# Patient Record
Sex: FEMALE | Race: WHITE | NOT HISPANIC OR LATINO | URBAN - METROPOLITAN AREA
[De-identification: names, ages, dates, MRNs, and addresses within clinical notes are randomized per-mention and may not be internally consistent; named-entity substitution may affect disease eponyms.]

---

## 2017-05-30 ENCOUNTER — OUTPATIENT (OUTPATIENT)
Dept: OUTPATIENT SERVICES | Facility: HOSPITAL | Age: 57
LOS: 1 days | Discharge: HOME | End: 2017-05-30

## 2017-06-28 DIAGNOSIS — Z13.820 ENCOUNTER FOR SCREENING FOR OSTEOPOROSIS: ICD-10-CM

## 2017-06-28 DIAGNOSIS — N95.9 UNSPECIFIED MENOPAUSAL AND PERIMENOPAUSAL DISORDER: ICD-10-CM

## 2018-02-02 PROBLEM — Z00.00 ENCOUNTER FOR PREVENTIVE HEALTH EXAMINATION: Status: ACTIVE | Noted: 2018-02-02

## 2018-04-05 ENCOUNTER — FORM ENCOUNTER (OUTPATIENT)
Age: 58
End: 2018-04-05

## 2018-04-06 ENCOUNTER — OUTPATIENT (OUTPATIENT)
Dept: OUTPATIENT SERVICES | Facility: HOSPITAL | Age: 58
LOS: 1 days | Discharge: HOME | End: 2018-04-06

## 2018-04-06 DIAGNOSIS — Z12.31 ENCOUNTER FOR SCREENING MAMMOGRAM FOR MALIGNANT NEOPLASM OF BREAST: ICD-10-CM

## 2019-04-04 ENCOUNTER — APPOINTMENT (OUTPATIENT)
Dept: OBGYN | Facility: CLINIC | Age: 59
End: 2019-04-04
Payer: COMMERCIAL

## 2019-04-04 ENCOUNTER — OUTPATIENT (OUTPATIENT)
Dept: OUTPATIENT SERVICES | Facility: HOSPITAL | Age: 59
LOS: 1 days | Discharge: HOME | End: 2019-04-04

## 2019-04-04 VITALS — BODY MASS INDEX: 33.74 KG/M2 | WEIGHT: 215 LBS | HEIGHT: 67 IN

## 2019-04-04 DIAGNOSIS — Z01.419 ENCOUNTER FOR GYNECOLOGICAL EXAMINATION (GENERAL) (ROUTINE) W/OUT ABNORMAL FINDINGS: ICD-10-CM

## 2019-04-04 DIAGNOSIS — Z30.9 ENCOUNTER FOR GYNECOLOGICAL EXAMINATION (GENERAL) (ROUTINE) W/OUT ABNORMAL FINDINGS: ICD-10-CM

## 2019-04-04 DIAGNOSIS — Z12.4 ENCOUNTER FOR GYNECOLOGICAL EXAMINATION (GENERAL) (ROUTINE) W/OUT ABNORMAL FINDINGS: ICD-10-CM

## 2019-04-04 LAB
BILIRUB UR QL STRIP: NORMAL
GLUCOSE UR-MCNC: NORMAL
HCG UR QL: NORMAL EU/DL
HGB UR QL STRIP.AUTO: NORMAL
KETONES UR-MCNC: NORMAL
LEUKOCYTE ESTERASE UR QL STRIP: NORMAL
NITRITE UR QL STRIP: NORMAL
PH UR STRIP: 5
PROT UR STRIP-MCNC: NORMAL
SP GR UR STRIP: 1.02

## 2019-04-04 PROCEDURE — 81003 URINALYSIS AUTO W/O SCOPE: CPT | Mod: NC,QW

## 2019-04-04 PROCEDURE — 76705 ECHO EXAM OF ABDOMEN: CPT

## 2019-04-04 PROCEDURE — 99396 PREV VISIT EST AGE 40-64: CPT

## 2019-04-04 PROCEDURE — 76830 TRANSVAGINAL US NON-OB: CPT

## 2019-04-04 PROCEDURE — 77085 DXA BONE DENSITY AXL VRT FX: CPT

## 2019-04-05 DIAGNOSIS — Z00.00 ENCOUNTER FOR GENERAL ADULT MEDICAL EXAMINATION WITHOUT ABNORMAL FINDINGS: ICD-10-CM

## 2019-05-30 ENCOUNTER — FORM ENCOUNTER (OUTPATIENT)
Age: 59
End: 2019-05-30

## 2019-05-31 ENCOUNTER — OUTPATIENT (OUTPATIENT)
Dept: OUTPATIENT SERVICES | Facility: HOSPITAL | Age: 59
LOS: 1 days | Discharge: HOME | End: 2019-05-31
Payer: COMMERCIAL

## 2019-05-31 DIAGNOSIS — Z12.31 ENCOUNTER FOR SCREENING MAMMOGRAM FOR MALIGNANT NEOPLASM OF BREAST: ICD-10-CM

## 2019-05-31 PROCEDURE — 77067 SCR MAMMO BI INCL CAD: CPT | Mod: 26

## 2019-05-31 PROCEDURE — 77063 BREAST TOMOSYNTHESIS BI: CPT | Mod: 26

## 2019-11-07 ENCOUNTER — APPOINTMENT (OUTPATIENT)
Dept: SURGERY | Facility: CLINIC | Age: 59
End: 2019-11-07
Payer: COMMERCIAL

## 2019-11-07 VITALS
SYSTOLIC BLOOD PRESSURE: 160 MMHG | DIASTOLIC BLOOD PRESSURE: 100 MMHG | WEIGHT: 209 LBS | HEIGHT: 67 IN | BODY MASS INDEX: 32.8 KG/M2
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
## 2019-11-07 PROCEDURE — 99242 OFF/OP CONSLTJ NEW/EST SF 20: CPT

## 2019-11-07 RX ORDER — DOXYCYCLINE HYCLATE 100 MG/1
100 CAPSULE ORAL
Refills: 0 | Status: ACTIVE | COMMUNITY

## 2019-11-07 NOTE — HISTORY OF PRESENT ILLNESS
[de-identified] : Patient presents to the office with a history of infected skin lesion of the forehead and the posterior neck region. She is undecided antibiotics. As the patient the redness has gone down since yesterday. No history of any trauma. No definite history of an insect bite or a tic bite.

## 2019-11-07 NOTE — CONSULT LETTER
[Dear  ___] : Dear  [unfilled], [Consult Closing:] : Thank you very much for allowing me to participate in the care of this patient.  If you have any questions, please do not hesitate to contact me. [Please see my note below.] : Please see my note below. [Consult Letter:] : I had the pleasure of evaluating your patient, [unfilled]. [Sincerely,] : Sincerely, [FreeTextEntry3] : eSa Oakes MD, FACS\par [facility]\[place] [address]\par

## 2019-11-07 NOTE — PHYSICAL EXAM
[Alert] : alert [Oriented to Person] : oriented to person [Oriented to Place] : oriented to place [Oriented to Time] : oriented to time [Calm] : calm [de-identified] : A 59-year-old female patient in no acute distress. [de-identified] : On the right side of the midline in the forehead region approximately 2 cm from the hairline patient has approximately 2 cm size indurated reddened area which is slightly tender to touch. There is no fluctuation or sign of abscess at present. As per patient the redness has gone down since yesterday. [de-identified] : In the posterior neck region approximately 1-1/2 cm area of redness with induration with a central point which appears to be a scab. The scab was removed there was no evidence of any tic on examination. There is no evidence of abscess formation. [de-identified] : As described above in the findings patient had 2 skin lesions or area of inflammation in the right side of her head and the posterior neck region.

## 2019-11-07 NOTE — ASSESSMENT
[FreeTextEntry1] : After examination patient was explained about my findings. She was explained that as there is no area of fluctuation it is unlikely for an abscess to be present in the forehead lesion. As she is just starting her antibiotics we should give more time for the antibiotics were and reexamine her  next week. Follow up in the office next week. Patient was also told that in case if there is any change in her forehead lesion or if there is any drainage or let us know. Possibility of MRSA also explained to the patient.

## 2019-11-11 ENCOUNTER — APPOINTMENT (OUTPATIENT)
Dept: CARDIOLOGY | Facility: CLINIC | Age: 59
End: 2019-11-11
Payer: COMMERCIAL

## 2019-11-11 PROCEDURE — 93351 STRESS TTE COMPLETE: CPT

## 2019-11-11 PROCEDURE — 93320 DOPPLER ECHO COMPLETE: CPT

## 2019-11-11 PROCEDURE — 93325 DOPPLER ECHO COLOR FLOW MAPG: CPT

## 2019-11-14 ENCOUNTER — APPOINTMENT (OUTPATIENT)
Dept: SURGERY | Facility: CLINIC | Age: 59
End: 2019-11-14
Payer: COMMERCIAL

## 2019-11-14 VITALS
WEIGHT: 209 LBS | BODY MASS INDEX: 32.8 KG/M2 | DIASTOLIC BLOOD PRESSURE: 85 MMHG | SYSTOLIC BLOOD PRESSURE: 135 MMHG | HEIGHT: 67 IN

## 2019-11-14 DIAGNOSIS — L03.90 CELLULITIS, UNSPECIFIED: ICD-10-CM

## 2019-11-14 PROCEDURE — 99212 OFFICE O/P EST SF 10 MIN: CPT

## 2019-11-14 NOTE — ASSESSMENT
[FreeTextEntry1] : . The swelling is markedly down. Patient has no symptoms. She was advised to continue the local treatment. Followup in the office as needed.

## 2020-12-21 PROBLEM — Z01.419 ENCOUNTER FOR PHYSICAL EXAMINATION, CONTRACEPTION, AND PAPANICOLAOU SMEAR: Status: RESOLVED | Noted: 2019-04-04 | Resolved: 2020-12-21

## 2021-07-19 ENCOUNTER — APPOINTMENT (OUTPATIENT)
Dept: OBGYN | Facility: CLINIC | Age: 61
End: 2021-07-19
Payer: COMMERCIAL

## 2021-07-19 ENCOUNTER — NON-APPOINTMENT (OUTPATIENT)
Age: 61
End: 2021-07-19

## 2021-07-19 LAB
BILIRUB UR QL STRIP: NORMAL
GLUCOSE UR-MCNC: NORMAL
HCG UR QL: 0.2 EU/DL
HGB UR QL STRIP.AUTO: NORMAL
KETONES UR-MCNC: 15
LEUKOCYTE ESTERASE UR QL STRIP: NORMAL
NITRITE UR QL STRIP: NORMAL
PH UR STRIP: 5
PROT UR STRIP-MCNC: NORMAL
SP GR UR STRIP: 1.03

## 2021-07-19 PROCEDURE — 99072 ADDL SUPL MATRL&STAF TM PHE: CPT

## 2021-07-19 PROCEDURE — 99396 PREV VISIT EST AGE 40-64: CPT

## 2021-07-19 PROCEDURE — 81003 URINALYSIS AUTO W/O SCOPE: CPT | Mod: NC,QW

## 2021-07-22 LAB — HPV HIGH+LOW RISK DNA PNL CVX: NOT DETECTED

## 2021-07-27 LAB — CYTOLOGY CVX/VAG DOC THIN PREP: ABNORMAL

## 2021-08-05 ENCOUNTER — RESULT REVIEW (OUTPATIENT)
Age: 61
End: 2021-08-05

## 2021-08-05 ENCOUNTER — OUTPATIENT (OUTPATIENT)
Dept: OUTPATIENT SERVICES | Facility: HOSPITAL | Age: 61
LOS: 1 days | Discharge: HOME | End: 2021-08-05
Payer: COMMERCIAL

## 2021-08-05 DIAGNOSIS — Z12.31 ENCOUNTER FOR SCREENING MAMMOGRAM FOR MALIGNANT NEOPLASM OF BREAST: ICD-10-CM

## 2021-08-05 PROCEDURE — 77067 SCR MAMMO BI INCL CAD: CPT | Mod: 26

## 2021-08-05 PROCEDURE — 77063 BREAST TOMOSYNTHESIS BI: CPT | Mod: 26

## 2021-08-06 DIAGNOSIS — N95.9 UNSPECIFIED MENOPAUSAL AND PERIMENOPAUSAL DISORDER: ICD-10-CM

## 2021-08-06 DIAGNOSIS — Z13.820 ENCOUNTER FOR SCREENING FOR OSTEOPOROSIS: ICD-10-CM

## 2021-08-20 ENCOUNTER — OUTPATIENT (OUTPATIENT)
Dept: OUTPATIENT SERVICES | Facility: HOSPITAL | Age: 61
LOS: 1 days | Discharge: HOME | End: 2021-08-20
Payer: COMMERCIAL

## 2021-08-20 DIAGNOSIS — R92.8 OTHER ABNORMAL AND INCONCLUSIVE FINDINGS ON DIAGNOSTIC IMAGING OF BREAST: ICD-10-CM

## 2021-08-20 PROCEDURE — 76642 ULTRASOUND BREAST LIMITED: CPT | Mod: 26,RT

## 2021-08-20 PROCEDURE — G0279: CPT | Mod: 26

## 2021-08-20 PROCEDURE — 77065 DX MAMMO INCL CAD UNI: CPT | Mod: 26,RT

## 2021-09-13 ENCOUNTER — NON-APPOINTMENT (OUTPATIENT)
Age: 61
End: 2021-09-13

## 2021-10-27 ENCOUNTER — APPOINTMENT (OUTPATIENT)
Dept: ENDOCRINOLOGY | Facility: CLINIC | Age: 61
End: 2021-10-27
Payer: COMMERCIAL

## 2021-10-27 VITALS
WEIGHT: 220 LBS | DIASTOLIC BLOOD PRESSURE: 88 MMHG | BODY MASS INDEX: 34.53 KG/M2 | HEIGHT: 67 IN | OXYGEN SATURATION: 98 % | HEART RATE: 81 BPM | TEMPERATURE: 97 F | SYSTOLIC BLOOD PRESSURE: 128 MMHG

## 2021-10-27 DIAGNOSIS — Z80.3 FAMILY HISTORY OF MALIGNANT NEOPLASM OF BREAST: ICD-10-CM

## 2021-10-27 DIAGNOSIS — Z78.9 OTHER SPECIFIED HEALTH STATUS: ICD-10-CM

## 2021-10-27 DIAGNOSIS — Z83.49 FAMILY HISTORY OF OTHER ENDOCRINE, NUTRITIONAL AND METABOLIC DISEASES: ICD-10-CM

## 2021-10-27 DIAGNOSIS — Z82.49 FAMILY HISTORY OF ISCHEMIC HEART DISEASE AND OTHER DISEASES OF THE CIRCULATORY SYSTEM: ICD-10-CM

## 2021-10-27 PROCEDURE — 99203 OFFICE O/P NEW LOW 30 MIN: CPT

## 2021-10-27 RX ORDER — PSYLLIUM HUSK 0.4 G
CAPSULE ORAL
Refills: 0 | Status: ACTIVE | COMMUNITY

## 2021-10-27 NOTE — ASSESSMENT
[Weight Loss] : weight loss [Levothyroxine] : The patient was instructed to take Levothyroxine on an empty stomach, separate from vitamins, and wait at least 30 minutes before eating [FreeTextEntry1] : 61 year old female with PMH of HTn who present for evaluation of hypothyroidism \par \par hypothyroidism /weight gain \par - diagnosed 2-3 years ago on routine blood work , was prescribed levothyroxine but never started it especially during covid pandemic. \par - in 7/2021 had repeat blood work and TSH was found to be 31 with Ft4 of 0.9 was again prescribed Lt4 but did not start taking it as she is afraid that once your on meds you are on it for life \par - reviewed hypothyroidism diagnosis as well as possibility of Hashimoto , reviewed indication for treatment in her condition given mildly elevated cholesterol , reviewed pill technique and monitoring as well as consequences and possibility of myxedema in extreme cases if hypothyroidism is sever and untreated \par - patient agreeable to start 50 mcg daily \par - will have labs with PCP in 12/2021 \par \par thyroid nodules\par - isoechoic 1.3 cm , f/u with US in 1 year

## 2021-10-27 NOTE — REVIEW OF SYSTEMS
[As Noted in HPI] : as noted in HPI [Chest Pain] : no chest pain [Palpitations] : no palpitations [Fast Heart Rate] : heart rate is not fast [Lower Ext Edema] : no lower extremity edema [Shortness Of Breath] : no shortness of breath [Wheezing] : no wheezing [SOB on Exertion] : no shortness of breath on exertion [Nausea] : no nausea [Constipation] : no constipation [Vomiting] : no vomiting [Diarrhea] : no diarrhea [Gas/Bloating] : no gas/bloating [Polyuria] : no polyuria [Muscle Weakness] : no muscle weakness [Myalgia] : no myalgia  [Joint Stiffness] : no joint stiffness [Acanthosis] : no acanthosis  [Dry Skin] : no dry skin [Hair Loss] : no hair loss [Headaches] : no headaches [Dizziness] : no dizziness [Tremors] : no tremors [Cold Intolerance] : no cold intolerance [Heat Intolerance] : no heat intolerance [Easy Bruising] : no tendency for easy bruising

## 2021-10-27 NOTE — PHYSICAL EXAM
[Alert] : alert [Well Nourished] : well nourished [Obese] : obese [No Acute Distress] : no acute distress [No Respiratory Distress] : no respiratory distress [No Accessory Muscle Use] : no accessory muscle use [Clear to Auscultation] : lungs were clear to auscultation bilaterally [Normal S1, S2] : normal S1 and S2 [No Murmurs] : no murmurs [Normal Rate] : heart rate was normal [No Edema] : no peripheral edema [Not Tender] : non-tender [Not Distended] : not distended [Soft] : abdomen soft [No CVA Tenderness] : no ~M costovertebral angle tenderness [No Spinal Tenderness] : no spinal tenderness [No Stigmata of Cushings Syndrome] : no stigmata of Cushings Syndrome [Abdominal Striae] : no abdominal striae [Acanthosis Nigricans] : acanthosis nigricans present [Acne] : no acne [Normal Reflexes] : deep tendon reflexes were 2+ and symmetric [No Tremors] : no tremors [Oriented x3] : oriented to person, place, and time [de-identified] : puffy face, loss of 1/3 end of eyebrows [de-identified] : thyroid ,ildly enlarged, palpable nodule

## 2021-10-27 NOTE — DATA REVIEWED
[FreeTextEntry1] : \par 7/2021: glucose 1010 crea 0.81 calcium 9.4  TSH 31.28  FT4 0.9  Hba1c 5%    tg 118 25 vit D 22\par \par \par DEXA scan (8/2021) Normal \par \par \par thyroid US (2021) \par isoechoic nodule 1.3 cm

## 2021-10-27 NOTE — HISTORY OF PRESENT ILLNESS
[FreeTextEntry1] : 61 year old female with PMH of HTn who present for evaluation of hypothyroidism \par \par hypothyroidism :\par - diagnosed 2-3 years ago on routine blood work , was prescribed levothyroxine but never started it especially during covid pandemic. \par - in 7/2021 had repeat blood work and TSH was found to be 31 with Ft4 of 0.9 was again prescribed Lt4 but did not start taking it as she is afraid that once your on meds you are on it for life \par - report weight gain but attributed to sedentary lifestyle and working from home and unhealthy eating. \par - no constipation, no cold intolerance and no unusual fatigue no hair or skin problem, face a little more puffy

## 2022-01-31 ENCOUNTER — APPOINTMENT (OUTPATIENT)
Dept: ENDOCRINOLOGY | Facility: CLINIC | Age: 62
End: 2022-01-31

## 2022-03-15 ENCOUNTER — APPOINTMENT (OUTPATIENT)
Dept: ENDOCRINOLOGY | Facility: CLINIC | Age: 62
End: 2022-03-15

## 2022-03-24 ENCOUNTER — APPOINTMENT (OUTPATIENT)
Dept: ENDOCRINOLOGY | Facility: CLINIC | Age: 62
End: 2022-03-24
Payer: COMMERCIAL

## 2022-03-24 VITALS
HEART RATE: 76 BPM | DIASTOLIC BLOOD PRESSURE: 88 MMHG | WEIGHT: 233 LBS | OXYGEN SATURATION: 98 % | SYSTOLIC BLOOD PRESSURE: 140 MMHG | HEIGHT: 67 IN | BODY MASS INDEX: 36.57 KG/M2 | TEMPERATURE: 97.2 F

## 2022-03-24 PROCEDURE — 99213 OFFICE O/P EST LOW 20 MIN: CPT

## 2022-03-24 NOTE — PHYSICAL EXAM
[Alert] : alert [Well Nourished] : well nourished [Obese] : obese [No Acute Distress] : no acute distress [No Respiratory Distress] : no respiratory distress [No Accessory Muscle Use] : no accessory muscle use [Clear to Auscultation] : lungs were clear to auscultation bilaterally [Normal S1, S2] : normal S1 and S2 [No Murmurs] : no murmurs [Normal Rate] : heart rate was normal [No Stigmata of Cushings Syndrome] : no stigmata of Cushings Syndrome [Acanthosis Nigricans] : acanthosis nigricans present [No Tremors] : no tremors [Oriented x3] : oriented to person, place, and time [Abdominal Striae] : no abdominal striae [Acne] : no acne [de-identified] : puffy face, loss of 1/3 end of eyebrows [de-identified] : thyroid  mildly enlarged

## 2022-03-24 NOTE — HISTORY OF PRESENT ILLNESS
[FreeTextEntry1] : 61 year old female with PMH of HTn who present for follow up evaluation of hypothyroidism and thyroid nodules \par \par hypothyroidism :\par - diagnosed 2-3 years ago on routine blood work , was prescribed levothyroxine but never started it especially during covid pandemic. \par - in 7/2021 had repeat blood work and TSH was found to be 31 with Ft4 of 0.9 was again prescribed Lt4 but did not start taking it as she is afraid that once your on meds you are on it for life \par - report weight gain but attributed to sedentary lifestyle and working from home and unhealthy eating. \par - no constipation, no cold intolerance and no unusual fatigue no hair or skin problem, face a little more puffy \par - 11/2021: started Lt4 50 mcg daily \par - 12/10/21: TSH 17 .17 T3 92 compliant with meds

## 2022-03-24 NOTE — DATA REVIEWED
[FreeTextEntry1] : \par 7/2021: glucose 1010 crea 0.81 calcium 9.4  TSH 31.28  FT4 0.9  Hba1c 5%    tg 118 25 vit D 22\par 12/2021: TSH 17  T3 \par \par DEXA scan (8/2021) Normal \par \par \par thyroid US (7/2021) \par isoechoic nodule 1.3 cm \par \par thyroid US (1/2022)\par bilateral subcm nodules \par

## 2022-03-24 NOTE — ASSESSMENT
[Weight Loss] : weight loss [Levothyroxine] : The patient was instructed to take Levothyroxine on an empty stomach, separate from vitamins, and wait at least 30 minutes before eating [FreeTextEntry1] : 61 year old female with PMH of HTn who present for follow up evaluation of hypothyroidism / thyroid nodules \par \par hypothyroidism /weight gain \par - diagnosed 2-3 years ago on routine blood work , was prescribed levothyroxine but never started it especially during covid pandemic. \par - in 7/2021 had repeat blood work and TSH was found to be 31 with Ft4 of 0.9 was again prescribed Lt4 but did not start taking it as she is afraid that once your on meds you are on it for life \par - 11/2022: started lt4 50 mcg daily \par - 12/2022: labs with PCP TSh was still 17 \par - do labs now and based on results will adjust dose \par \par thyroid nodules\par subcm \par repeat US in 1-6/2023 \par \par advised can follow up with PCP

## 2022-03-25 LAB
T4 FREE SERPL-MCNC: 0.9 NG/DL
TSH SERPL-ACNC: 36.58 UIU/ML

## 2022-06-27 LAB
T4 FREE SERPL-MCNC: 1.1 NG/DL
TSH SERPL-ACNC: 13.04 UIU/ML

## 2022-07-08 ENCOUNTER — APPOINTMENT (OUTPATIENT)
Dept: ENDOCRINOLOGY | Facility: CLINIC | Age: 62
End: 2022-07-08

## 2022-07-08 VITALS
BODY MASS INDEX: 34.53 KG/M2 | WEIGHT: 220 LBS | OXYGEN SATURATION: 98 % | SYSTOLIC BLOOD PRESSURE: 122 MMHG | HEART RATE: 75 BPM | DIASTOLIC BLOOD PRESSURE: 78 MMHG | HEIGHT: 67 IN

## 2022-07-08 PROCEDURE — 99213 OFFICE O/P EST LOW 20 MIN: CPT

## 2022-07-08 NOTE — DATA REVIEWED
[FreeTextEntry1] : \par 7/2021: glucose 1010 crea 0.81 calcium 9.4  TSH 31.28  FT4 0.9  Hba1c 5%    tg 118 25 vit D 22\par 12/2021: TSH 17  T3 \par 3/2022: TSH 36.58   Ft4 0.9 \par 6/2022: TSH 13.04  Ft4 1.1 \par \par DEXA scan (8/2021) Normal \par \par \par thyroid US (7/2021) \par isoechoic nodule 1.3 cm \par \par thyroid US (1/2022)\par bilateral subcm nodules \par

## 2022-07-08 NOTE — PHYSICAL EXAM
[Alert] : alert [Well Nourished] : well nourished [Obese] : obese [No Acute Distress] : no acute distress [No Respiratory Distress] : no respiratory distress [No Accessory Muscle Use] : no accessory muscle use [Clear to Auscultation] : lungs were clear to auscultation bilaterally [Normal S1, S2] : normal S1 and S2 [No Murmurs] : no murmurs [Normal Rate] : heart rate was normal [No Stigmata of Cushings Syndrome] : no stigmata of Cushings Syndrome [Acanthosis Nigricans] : acanthosis nigricans present [No Tremors] : no tremors [Oriented x3] : oriented to person, place, and time [No Proptosis] : no proptosis [No Lid Lag] : no lid lag [Not Tender] : non-tender [Soft] : abdomen soft [Abdominal Striae] : no abdominal striae [Acne] : no acne [de-identified] : thyroid  mildly enlarged

## 2022-07-08 NOTE — ASSESSMENT
[Weight Loss] : weight loss [Levothyroxine] : The patient was instructed to take Levothyroxine on an empty stomach, separate from vitamins, and wait at least 30 minutes before eating [FreeTextEntry1] : 62 year old female with PMH of HTn who present for follow up evaluation of hypothyroidism / thyroid nodules \par \par hypothyroidism /weight gain \par - diagnosed 2-3 years ago on routine blood work , was prescribed levothyroxine but never started it especially during covid pandemic. \par - in 7/2021 had repeat blood work and TSH was found to be 31 with Ft4 of 0.9 was again prescribed Lt4 but did not start taking it as she is afraid that once your on meds you are on it for life \par - 11/2022: started lt4 50 mcg daily \par - 12/2021: labs with PCP TSh was still 17 \par -3/2022: TSH 36 dose was increased to 75 mcg daily \par - 7/2022 : TSH 13 FT4 1.1 feels ok and no hypo/hyperthyroid symptoms, will increase to 88 mcg daily , she is traveling to [facility], reviewed meds intake with time change \par - redo labs in 2-3 months \par \par \par thyroid nodules\par subcm \par repeat US in 1-6/2023 \par \par

## 2022-07-08 NOTE — HISTORY OF PRESENT ILLNESS
[FreeTextEntry1] : 61 year old female with PMH of HTn who present for follow up evaluation of hypothyroidism and thyroid nodules \par \par hypothyroidism :\par - diagnosed 2-3 years ago on routine blood work , was prescribed levothyroxine but never started it especially during covid pandemic. \par - in 7/2021 had repeat blood work and TSH was found to be 31 with Ft4 of 0.9 was again prescribed Lt4 but did not start taking it as she is afraid that once your on meds you are on it for life \par - report weight gain but attributed to sedentary lifestyle and working from home and unhealthy eating. \par - no constipation, no cold intolerance and no unusual fatigue no hair or skin problem, face a little more puffy \par - 11/2021: started Lt4 50 mcg daily \par - 12/10/21: TSH 17 .17 T3 92 compliant with meds \par - 3/2022 : TSH 36 , dose increased to 75 mcg daily \par - 7/2022: TSH 13 Ft4 1.1 , taking it daily, good pill technique, no hypothyroid symptoms

## 2022-07-08 NOTE — REVIEW OF SYSTEMS
[As Noted in HPI] : as noted in HPI [Recent Weight Loss (___ Lbs)] : recent weight loss: [unfilled] lbs [Chest Pain] : no chest pain [Palpitations] : no palpitations [Fast Heart Rate] : heart rate is not fast [Lower Ext Edema] : no lower extremity edema [Shortness Of Breath] : no shortness of breath [Wheezing] : no wheezing [SOB on Exertion] : no shortness of breath on exertion [Nausea] : no nausea [Constipation] : no constipation [Vomiting] : no vomiting [Diarrhea] : no diarrhea [Gas/Bloating] : no gas/bloating [Polyuria] : no polyuria [Muscle Weakness] : no muscle weakness [Myalgia] : no myalgia  [Joint Stiffness] : no joint stiffness [Acanthosis] : no acanthosis  [Dry Skin] : no dry skin [Hair Loss] : no hair loss [Headaches] : no headaches [Dizziness] : no dizziness [Tremors] : no tremors [Cold Intolerance] : no cold intolerance [Heat Intolerance] : no heat intolerance [Easy Bruising] : no tendency for easy bruising

## 2022-09-19 ENCOUNTER — APPOINTMENT (OUTPATIENT)
Dept: OBGYN | Facility: CLINIC | Age: 62
End: 2022-09-19

## 2022-09-19 VITALS — HEIGHT: 67 IN | TEMPERATURE: 98 F | WEIGHT: 219 LBS | BODY MASS INDEX: 34.37 KG/M2

## 2022-09-19 LAB
BILIRUB UR QL STRIP: NORMAL
GLUCOSE UR-MCNC: NORMAL
HCG UR QL: 0.2 EU/DL
HGB UR QL STRIP.AUTO: NORMAL
KETONES UR-MCNC: NORMAL
LEUKOCYTE ESTERASE UR QL STRIP: NORMAL
NITRITE UR QL STRIP: NORMAL
PH UR STRIP: 60
PROT UR STRIP-MCNC: NORMAL
SP GR UR STRIP: 1.02

## 2022-09-19 PROCEDURE — 99396 PREV VISIT EST AGE 40-64: CPT

## 2022-09-19 PROCEDURE — 81003 URINALYSIS AUTO W/O SCOPE: CPT | Mod: NC,QW

## 2022-09-25 LAB — HPV HIGH+LOW RISK DNA PNL CVX: NOT DETECTED

## 2022-09-29 LAB — CYTOLOGY CVX/VAG DOC THIN PREP: ABNORMAL

## 2022-09-30 ENCOUNTER — RESULT REVIEW (OUTPATIENT)
Age: 62
End: 2022-09-30

## 2022-09-30 ENCOUNTER — OUTPATIENT (OUTPATIENT)
Dept: OUTPATIENT SERVICES | Facility: HOSPITAL | Age: 62
LOS: 1 days | Discharge: HOME | End: 2022-09-30

## 2022-09-30 DIAGNOSIS — Z12.31 ENCOUNTER FOR SCREENING MAMMOGRAM FOR MALIGNANT NEOPLASM OF BREAST: ICD-10-CM

## 2022-09-30 DIAGNOSIS — Z80.3 FAMILY HISTORY OF MALIGNANT NEOPLASM OF BREAST: ICD-10-CM

## 2022-09-30 PROCEDURE — 77067 SCR MAMMO BI INCL CAD: CPT | Mod: 26

## 2022-09-30 PROCEDURE — 77063 BREAST TOMOSYNTHESIS BI: CPT | Mod: 26

## 2022-10-10 LAB
T4 FREE SERPL-MCNC: 1.3 NG/DL
TSH SERPL-ACNC: 8.17 UIU/ML

## 2022-10-17 ENCOUNTER — APPOINTMENT (OUTPATIENT)
Dept: ENDOCRINOLOGY | Facility: CLINIC | Age: 62
End: 2022-10-17

## 2022-10-17 VITALS
DIASTOLIC BLOOD PRESSURE: 80 MMHG | TEMPERATURE: 97.6 F | BODY MASS INDEX: 34.53 KG/M2 | HEIGHT: 67 IN | WEIGHT: 220 LBS | OXYGEN SATURATION: 98 % | HEART RATE: 76 BPM | SYSTOLIC BLOOD PRESSURE: 128 MMHG

## 2022-10-17 PROCEDURE — 99213 OFFICE O/P EST LOW 20 MIN: CPT

## 2022-10-17 NOTE — HISTORY OF PRESENT ILLNESS
[FreeTextEntry1] : 62 year old female with PMH of HTn who present for follow up evaluation of hypothyroidism and thyroid nodules \par \par hypothyroidism :\par - diagnosed 2-3 years ago on routine blood work , was prescribed levothyroxine but never started it especially during covid pandemic. \par - in 7/2021 had repeat blood work and TSH was found to be 31 with Ft4 of 0.9 was again prescribed Lt4 but did not start taking it as she is afraid that once your on meds you are on it for life \par - report weight gain but attributed to sedentary lifestyle and working from home and unhealthy eating. \par - no constipation, no cold intolerance and no unusual fatigue no hair or skin problem, face a little more puffy \par - 11/2021: started Lt4 50 mcg daily \par - 12/10/21: TSH 17 .17 T3 92 compliant with meds \par - 3/2022 : TSH 36 , dose increased to 75 mcg daily \par - 7/2022: TSH 13 Ft4 1.1 , taking it daily, good pill technique, no hypothyroid symptoms  dose increased to 88 mcg daily \par - 10/2022: TSH 8  ft4 1.3 feeling good , no hypo/hyper symptoms,

## 2022-10-17 NOTE — PHYSICAL EXAM
[Alert] : alert [Well Nourished] : well nourished [Obese] : obese [No Acute Distress] : no acute distress [No Proptosis] : no proptosis [No Lid Lag] : no lid lag [No Respiratory Distress] : no respiratory distress [No Accessory Muscle Use] : no accessory muscle use [Clear to Auscultation] : lungs were clear to auscultation bilaterally [Normal S1, S2] : normal S1 and S2 [No Murmurs] : no murmurs [Normal Rate] : heart rate was normal [Not Tender] : non-tender [Soft] : abdomen soft [No Stigmata of Cushings Syndrome] : no stigmata of Cushings Syndrome [Acanthosis Nigricans] : acanthosis nigricans present [No Tremors] : no tremors [Oriented x3] : oriented to person, place, and time [Abdominal Striae] : no abdominal striae [Acne] : no acne [de-identified] : thyroid  mildly enlarged

## 2022-10-17 NOTE — DATA REVIEWED
[FreeTextEntry1] : \par 7/2021: glucose 1010 crea 0.81 calcium 9.4  TSH 31.28  FT4 0.9  Hba1c 5%    tg 118 25 vit D 22\par 12/2021: TSH 17  T3 \par 3/2022: TSH 36.58   Ft4 0.9 \par 6/2022: TSH 13.04  Ft4 1.1 \par 10/2022: TSH 8.17  Ft4 1.3 \par \par DEXA scan (8/2021) Normal \par \par \par thyroid US (7/2021) \par isoechoic nodule 1.3 cm \par \par thyroid US (1/2022)\par bilateral subcm nodules \par

## 2022-10-17 NOTE — REVIEW OF SYSTEMS
[As Noted in HPI] : as noted in HPI [Recent Weight Loss (___ Lbs)] : no recent weight loss [Chest Pain] : no chest pain [Palpitations] : no palpitations [Fast Heart Rate] : heart rate is not fast [Lower Ext Edema] : no lower extremity edema [Shortness Of Breath] : no shortness of breath [Wheezing] : no wheezing [SOB on Exertion] : no shortness of breath on exertion [Nausea] : no nausea [Constipation] : no constipation [Vomiting] : no vomiting [Diarrhea] : no diarrhea [Gas/Bloating] : no gas/bloating [Polyuria] : no polyuria [Muscle Weakness] : no muscle weakness [Myalgia] : no myalgia  [Joint Stiffness] : no joint stiffness [Acanthosis] : no acanthosis  [Dry Skin] : no dry skin [Hair Loss] : no hair loss [Headaches] : no headaches [Dizziness] : no dizziness [Tremors] : no tremors [Cold Intolerance] : no cold intolerance [Heat Intolerance] : no heat intolerance [Easy Bruising] : no tendency for easy bruising

## 2022-10-17 NOTE — ASSESSMENT
[Weight Loss] : weight loss [Levothyroxine] : The patient was instructed to take Levothyroxine on an empty stomach, separate from vitamins, and wait at least 30 minutes before eating [FreeTextEntry1] : 62 year old female with PMH of HTN who present for follow up evaluation of hypothyroidism / thyroid nodules \par \par hypothyroidism /weight gain \par - diagnosed 2-3 years ago on routine blood work , was prescribed levothyroxine but never started it especially during covid pandemic. \par - in 7/2021 had repeat blood work and TSH was found to be 31 with Ft4 of 0.9 was again prescribed Lt4 but did not start taking it as she is afraid that once your on meds you are on it for life \par - 11/2022: started lt4 50 mcg daily \par - 12/2021: labs with PCP TSh was still 17 \par -3/2022: TSH 36 dose was increased to 75 mcg daily \par - 7/2022 : TSH 13 FT4 1.1 feels ok and no hypo/hyperthyroid symptoms,  increased to 88 mcg daily\par - 10/2022: TSH 8 ft4 1.3 on Lt4 88 mcg daily, compliant with good pill technique \par - increase to 100 mcg daily and redo labs in 3 months \par \par \par thyroid nodules\par subcm \par repeat US in 1-6/2023 before next visit \par \par

## 2023-01-19 LAB
T4 FREE SERPL-MCNC: 1.1 NG/DL
TSH SERPL-ACNC: 10.87 UIU/ML

## 2023-05-22 ENCOUNTER — APPOINTMENT (OUTPATIENT)
Dept: ENDOCRINOLOGY | Facility: CLINIC | Age: 63
End: 2023-05-22
Payer: COMMERCIAL

## 2023-05-22 VITALS
HEART RATE: 75 BPM | OXYGEN SATURATION: 98 % | WEIGHT: 235 LBS | TEMPERATURE: 97.2 F | SYSTOLIC BLOOD PRESSURE: 140 MMHG | BODY MASS INDEX: 36.88 KG/M2 | HEIGHT: 67 IN | DIASTOLIC BLOOD PRESSURE: 80 MMHG

## 2023-05-22 LAB
T4 FREE SERPL-MCNC: 1.4 NG/DL
TSH SERPL-ACNC: 5.66 UIU/ML

## 2023-05-22 PROCEDURE — 99213 OFFICE O/P EST LOW 20 MIN: CPT

## 2023-05-22 NOTE — REVIEW OF SYSTEMS
[As Noted in HPI] : as noted in HPI [Recent Weight Loss (___ Lbs)] : no recent weight loss [Chest Pain] : no chest pain [Palpitations] : no palpitations [Fast Heart Rate] : heart rate is not fast [Lower Ext Edema] : no lower extremity edema [Shortness Of Breath] : no shortness of breath [Wheezing] : no wheezing [Nausea] : no nausea [SOB on Exertion] : no shortness of breath on exertion [Constipation] : no constipation [Vomiting] : no vomiting [Diarrhea] : no diarrhea [Gas/Bloating] : no gas/bloating [Polyuria] : no polyuria [Muscle Weakness] : no muscle weakness [Myalgia] : no myalgia  [Joint Stiffness] : no joint stiffness [Acanthosis] : no acanthosis  [Dry Skin] : no dry skin [Hair Loss] : no hair loss [Headaches] : no headaches [Dizziness] : no dizziness [Tremors] : no tremors [Cold Intolerance] : no cold intolerance [Heat Intolerance] : no heat intolerance [Easy Bruising] : no tendency for easy bruising

## 2023-05-22 NOTE — ASSESSMENT
[Weight Loss] : weight loss [Levothyroxine] : The patient was instructed to take Levothyroxine on an empty stomach, separate from vitamins, and wait at least 30 minutes before eating [FreeTextEntry1] : 62 year old female with PMH of HTN who present for follow up evaluation of hypothyroidism / thyroid nodules \par \par hypothyroidism /weight gain \par - diagnosed 2-3 years ago on routine blood work , was prescribed levothyroxine but never started it especially during covid pandemic. \par - in 7/2021 had repeat blood work and TSH was found to be 31 with Ft4 of 0.9 was again prescribed Lt4 but did not start taking it as she is afraid that once your on meds you are on it for life \par - 11/2022: started lt4 50 mcg daily \par - 12/2021: labs with PCP TSh was still 17 \par -3/2022: TSH 36 dose was increased to 75 mcg daily \par - 7/2022 : TSH 13 FT4 1.1 feels ok and no hypo/hyperthyroid symptoms,  increased to 88 mcg daily\par - 10/2022: TSH 8 ft4 1.3 on Lt4 88 mcg daily, compliant with good pill technique \par - 5/2023: remain on Lt4 100 mcg daily compliant and good ill technique , will go do labs today \par \par \par #thyroid nodules\par subcm \par no compressive symptoms \par 1/2023: left nodule Tr4 subcm will monitor with US ( 1/2024) \par \par

## 2023-05-22 NOTE — DATA REVIEWED
[FreeTextEntry1] : \par 7/2021: glucose 1010 crea 0.81 calcium 9.4  TSH 31.28  FT4 0.9  Hba1c 5%    tg 118 25 vit D 22\par 12/2021: TSH 17  T3 \par 3/2022: TSH 36.58   Ft4 0.9 \par 6/2022: TSH 13.04  Ft4 1.1 \par 10/2022: TSH 8.17  Ft4 1.3 \par 1/2023: TSH 10  ft4 1.1 \par \par \par DEXA scan (8/2021) Normal \par \par \par thyroid US (7/2021) \par isoechoic nodule 1.3 cm \par \par thyroid US (1/2022)\par bilateral subcm nodules \par \par thyroid US ( 1/2023) :\par diffusely heterogenous thyroid \par left nodule 1 hypoechoic 7.4x4.9x5.7 mm Tr4 \par 1 left cervical Ln with fatty hilum benign appearing \par

## 2023-05-22 NOTE — HISTORY OF PRESENT ILLNESS
[FreeTextEntry1] : 62 year old female with PMH of HTn who present for follow up evaluation of hypothyroidism and thyroid nodules \par \par hypothyroidism :\par - diagnosed 2-3 years ago on routine blood work , was prescribed levothyroxine but never started it especially during covid pandemic. \par - in 7/2021 had repeat blood work and TSH was found to be 31 with Ft4 of 0.9 was again prescribed Lt4 but did not start taking it as she is afraid that once your on meds you are on it for life \par - report weight gain but attributed to sedentary lifestyle and working from home and unhealthy eating. \par - no constipation, no cold intolerance and no unusual fatigue no hair or skin problem, face a little more puffy \par - 11/2021: started Lt4 50 mcg daily \par - 12/10/21: TSH 17 .17 T3 92 compliant with meds \par - 3/2022 : TSH 36 , dose increased to 75 mcg daily \par - 7/2022: TSH 13 Ft4 1.1 , taking it daily, good pill technique, no hypothyroid symptoms  dose increased to 88 mcg daily \par - 10/2022: TSH 8  ft4 1.3 feeling good , no hypo/hyper symptoms, \par - 1/2023: TSH 10  Ft4 1.1 feel ok , had jury duty and couldn't follow up so was still on same dose of Lt4 100 mcg daily \par - 5/2023: feels ok remain on lt4 100 mcg daily good pill technique and no symptoms

## 2023-05-22 NOTE — PHYSICAL EXAM
[Alert] : alert [Well Nourished] : well nourished [Obese] : obese [No Acute Distress] : no acute distress [No Proptosis] : no proptosis [No Lid Lag] : no lid lag [No Respiratory Distress] : no respiratory distress [No Accessory Muscle Use] : no accessory muscle use [Clear to Auscultation] : lungs were clear to auscultation bilaterally [Normal S1, S2] : normal S1 and S2 [No Murmurs] : no murmurs [Normal Rate] : heart rate was normal [No Stigmata of Cushings Syndrome] : no stigmata of Cushings Syndrome [Acanthosis Nigricans] : acanthosis nigricans present [No Tremors] : no tremors [Oriented x3] : oriented to person, place, and time [No Edema] : no peripheral edema [Abdominal Striae] : no abdominal striae [Acne] : no acne [de-identified] : thyroid  mildly enlarged

## 2023-10-09 ENCOUNTER — APPOINTMENT (OUTPATIENT)
Dept: OBGYN | Facility: CLINIC | Age: 63
End: 2023-10-09
Payer: COMMERCIAL

## 2023-10-09 VITALS — HEIGHT: 67 IN | WEIGHT: 230 LBS | BODY MASS INDEX: 36.1 KG/M2

## 2023-10-09 DIAGNOSIS — Z01.411 ENCOUNTER FOR GYNECOLOGICAL EXAMINATION (GENERAL) (ROUTINE) WITH ABNORMAL FINDINGS: ICD-10-CM

## 2023-10-09 DIAGNOSIS — N95.2 POSTMENOPAUSAL ATROPHIC VAGINITIS: ICD-10-CM

## 2023-10-09 PROCEDURE — 99396 PREV VISIT EST AGE 40-64: CPT

## 2023-10-12 RX ORDER — LEVOTHYROXINE SODIUM 0.11 MG/1
112 TABLET ORAL DAILY
Qty: 90 | Refills: 1 | Status: ACTIVE | COMMUNITY
Start: 2023-05-22 | End: 1900-01-01

## 2023-10-13 ENCOUNTER — OUTPATIENT (OUTPATIENT)
Dept: OUTPATIENT SERVICES | Facility: HOSPITAL | Age: 63
LOS: 1 days | End: 2023-10-13
Payer: COMMERCIAL

## 2023-10-13 DIAGNOSIS — Z00.8 ENCOUNTER FOR OTHER GENERAL EXAMINATION: ICD-10-CM

## 2023-10-13 DIAGNOSIS — Z12.31 ENCOUNTER FOR SCREENING MAMMOGRAM FOR MALIGNANT NEOPLASM OF BREAST: ICD-10-CM

## 2023-10-13 DIAGNOSIS — Z13.820 ENCOUNTER FOR SCREENING FOR OSTEOPOROSIS: ICD-10-CM

## 2023-10-13 PROCEDURE — 77067 SCR MAMMO BI INCL CAD: CPT | Mod: 26

## 2023-10-13 PROCEDURE — 77067 SCR MAMMO BI INCL CAD: CPT

## 2023-10-13 PROCEDURE — 77063 BREAST TOMOSYNTHESIS BI: CPT | Mod: 26

## 2023-10-13 PROCEDURE — 77080 DXA BONE DENSITY AXIAL: CPT

## 2023-10-13 PROCEDURE — 77063 BREAST TOMOSYNTHESIS BI: CPT

## 2023-10-14 DIAGNOSIS — Z13.820 ENCOUNTER FOR SCREENING FOR OSTEOPOROSIS: ICD-10-CM

## 2023-10-14 DIAGNOSIS — Z12.31 ENCOUNTER FOR SCREENING MAMMOGRAM FOR MALIGNANT NEOPLASM OF BREAST: ICD-10-CM

## 2023-10-14 LAB — CYTOLOGY CVX/VAG DOC THIN PREP: NORMAL

## 2023-11-27 ENCOUNTER — APPOINTMENT (OUTPATIENT)
Dept: ENDOCRINOLOGY | Facility: CLINIC | Age: 63
End: 2023-11-27
Payer: COMMERCIAL

## 2023-11-27 VITALS
SYSTOLIC BLOOD PRESSURE: 138 MMHG | DIASTOLIC BLOOD PRESSURE: 88 MMHG | HEIGHT: 67 IN | BODY MASS INDEX: 34.53 KG/M2 | OXYGEN SATURATION: 98 % | HEART RATE: 78 BPM | WEIGHT: 220 LBS

## 2023-11-27 DIAGNOSIS — E03.9 HYPOTHYROIDISM, UNSPECIFIED: ICD-10-CM

## 2023-11-27 DIAGNOSIS — E04.2 NONTOXIC MULTINODULAR GOITER: ICD-10-CM

## 2023-11-27 DIAGNOSIS — E66.9 OBESITY, UNSPECIFIED: ICD-10-CM

## 2023-11-27 PROCEDURE — 99213 OFFICE O/P EST LOW 20 MIN: CPT

## 2023-11-27 RX ORDER — LEVOTHYROXINE SODIUM 0.1 MG/1
100 TABLET ORAL
Qty: 30 | Refills: 3 | Status: DISCONTINUED | COMMUNITY
Start: 2022-10-17 | End: 2023-11-27

## 2023-11-27 RX ORDER — LEVOTHYROXINE SODIUM 0.07 MG/1
75 TABLET ORAL DAILY
Qty: 90 | Refills: 1 | Status: DISCONTINUED | COMMUNITY
Start: 2022-03-25 | End: 2023-11-27

## 2023-11-27 RX ORDER — LEVOTHYROXINE SODIUM 0.09 MG/1
88 TABLET ORAL DAILY
Qty: 90 | Refills: 1 | Status: DISCONTINUED | COMMUNITY
Start: 2022-07-08 | End: 2023-11-27

## 2023-12-06 ENCOUNTER — OUTPATIENT (OUTPATIENT)
Dept: OUTPATIENT SERVICES | Facility: HOSPITAL | Age: 63
LOS: 1 days | End: 2023-12-06
Payer: COMMERCIAL

## 2023-12-06 DIAGNOSIS — M54.30 SCIATICA, UNSPECIFIED SIDE: ICD-10-CM

## 2023-12-06 DIAGNOSIS — Z00.8 ENCOUNTER FOR OTHER GENERAL EXAMINATION: ICD-10-CM

## 2023-12-06 PROCEDURE — 93970 EXTREMITY STUDY: CPT

## 2023-12-06 PROCEDURE — 93970 EXTREMITY STUDY: CPT | Mod: 26

## 2023-12-07 DIAGNOSIS — M54.30 SCIATICA, UNSPECIFIED SIDE: ICD-10-CM

## 2024-12-10 ENCOUNTER — APPOINTMENT (OUTPATIENT)
Dept: OBGYN | Facility: CLINIC | Age: 64
End: 2024-12-10
Payer: COMMERCIAL

## 2024-12-10 VITALS — WEIGHT: 210 LBS | BODY MASS INDEX: 32.96 KG/M2 | HEIGHT: 67 IN

## 2024-12-10 DIAGNOSIS — Z01.419 ENCOUNTER FOR GYNECOLOGICAL EXAMINATION (GENERAL) (ROUTINE) W/OUT ABNORMAL FINDINGS: ICD-10-CM

## 2024-12-10 DIAGNOSIS — Z30.9 ENCOUNTER FOR GYNECOLOGICAL EXAMINATION (GENERAL) (ROUTINE) W/OUT ABNORMAL FINDINGS: ICD-10-CM

## 2024-12-10 PROCEDURE — 99397 PER PM REEVAL EST PAT 65+ YR: CPT

## 2024-12-12 LAB
APPEARANCE: CLEAR
BILIRUBIN URINE: NEGATIVE
BLOOD URINE: ABNORMAL
COLOR: YELLOW
GLUCOSE QUALITATIVE U: NEGATIVE
KETONES URINE: NEGATIVE
LEUKOCYTE ESTERASE URINE: ABNORMAL
NITRITE URINE: NEGATIVE
PH URINE: 5.5
PROTEIN URINE: NEGATIVE
SPECIFIC GRAVITY URINE: 1.02
UROBILINOGEN URINE: 0.2 (ref 0.2–?)

## 2024-12-18 LAB — CYTOLOGY CVX/VAG DOC THIN PREP: ABNORMAL

## 2024-12-20 ENCOUNTER — RESULT REVIEW (OUTPATIENT)
Age: 64
End: 2024-12-20

## 2024-12-20 ENCOUNTER — OUTPATIENT (OUTPATIENT)
Dept: OUTPATIENT SERVICES | Facility: HOSPITAL | Age: 64
LOS: 1 days | End: 2024-12-20
Payer: COMMERCIAL

## 2024-12-20 DIAGNOSIS — Z12.31 ENCOUNTER FOR SCREENING MAMMOGRAM FOR MALIGNANT NEOPLASM OF BREAST: ICD-10-CM

## 2024-12-20 PROCEDURE — 77067 SCR MAMMO BI INCL CAD: CPT | Mod: 26

## 2024-12-20 PROCEDURE — 77063 BREAST TOMOSYNTHESIS BI: CPT | Mod: 26

## 2024-12-20 PROCEDURE — 77067 SCR MAMMO BI INCL CAD: CPT

## 2024-12-20 PROCEDURE — 77063 BREAST TOMOSYNTHESIS BI: CPT

## 2024-12-21 DIAGNOSIS — Z12.31 ENCOUNTER FOR SCREENING MAMMOGRAM FOR MALIGNANT NEOPLASM OF BREAST: ICD-10-CM

## 2025-01-22 DIAGNOSIS — E04.2 NONTOXIC MULTINODULAR GOITER: ICD-10-CM

## 2025-01-22 DIAGNOSIS — E03.9 HYPOTHYROIDISM, UNSPECIFIED: ICD-10-CM

## 2025-02-03 ENCOUNTER — APPOINTMENT (OUTPATIENT)
Dept: ENDOCRINOLOGY | Facility: CLINIC | Age: 65
End: 2025-02-03
Payer: COMMERCIAL

## 2025-02-03 VITALS
HEART RATE: 74 BPM | SYSTOLIC BLOOD PRESSURE: 164 MMHG | HEIGHT: 67 IN | BODY MASS INDEX: 32.65 KG/M2 | DIASTOLIC BLOOD PRESSURE: 97 MMHG | OXYGEN SATURATION: 98 % | WEIGHT: 208 LBS

## 2025-02-03 DIAGNOSIS — E03.9 HYPOTHYROIDISM, UNSPECIFIED: ICD-10-CM

## 2025-02-03 DIAGNOSIS — E66.9 OBESITY, UNSPECIFIED: ICD-10-CM

## 2025-02-03 PROCEDURE — 99213 OFFICE O/P EST LOW 20 MIN: CPT

## 2025-08-08 ENCOUNTER — APPOINTMENT (OUTPATIENT)
Dept: ENDOCRINOLOGY | Facility: CLINIC | Age: 65
End: 2025-08-08
Payer: COMMERCIAL

## 2025-08-08 DIAGNOSIS — E03.9 HYPOTHYROIDISM, UNSPECIFIED: ICD-10-CM

## 2025-08-08 DIAGNOSIS — E04.2 NONTOXIC MULTINODULAR GOITER: ICD-10-CM

## 2025-08-08 PROCEDURE — 99212 OFFICE O/P EST SF 10 MIN: CPT | Mod: 93
